# Patient Record
Sex: FEMALE | Race: WHITE | NOT HISPANIC OR LATINO | Employment: OTHER | URBAN - METROPOLITAN AREA
[De-identification: names, ages, dates, MRNs, and addresses within clinical notes are randomized per-mention and may not be internally consistent; named-entity substitution may affect disease eponyms.]

---

## 2018-05-23 ENCOUNTER — HOSPITAL ENCOUNTER (EMERGENCY)
Facility: HOSPITAL | Age: 63
Discharge: HOME | End: 2018-05-24
Attending: EMERGENCY MEDICINE
Payer: COMMERCIAL

## 2018-05-23 DIAGNOSIS — L50.9 HIVES: ICD-10-CM

## 2018-05-23 DIAGNOSIS — T78.40XA ALLERGIC REACTION, INITIAL ENCOUNTER: Primary | ICD-10-CM

## 2018-05-23 PROCEDURE — 96375 TX/PRO/DX INJ NEW DRUG ADDON: CPT

## 2018-05-23 PROCEDURE — 96374 THER/PROPH/DIAG INJ IV PUSH: CPT

## 2018-05-23 PROCEDURE — 96361 HYDRATE IV INFUSION ADD-ON: CPT

## 2018-05-23 PROCEDURE — 96372 THER/PROPH/DIAG INJ SC/IM: CPT

## 2018-05-23 PROCEDURE — 99284 EMERGENCY DEPT VISIT MOD MDM: CPT | Mod: 25

## 2018-05-23 PROCEDURE — 96376 TX/PRO/DX INJ SAME DRUG ADON: CPT

## 2018-05-24 VITALS
SYSTOLIC BLOOD PRESSURE: 127 MMHG | OXYGEN SATURATION: 96 % | RESPIRATION RATE: 18 BRPM | HEIGHT: 66 IN | DIASTOLIC BLOOD PRESSURE: 64 MMHG | WEIGHT: 180.75 LBS | HEART RATE: 73 BPM | TEMPERATURE: 98 F | BODY MASS INDEX: 29.05 KG/M2

## 2018-05-24 VITALS
RESPIRATION RATE: 18 BRPM | HEART RATE: 71 BPM | SYSTOLIC BLOOD PRESSURE: 111 MMHG | DIASTOLIC BLOOD PRESSURE: 69 MMHG | HEIGHT: 66 IN | OXYGEN SATURATION: 95 % | TEMPERATURE: 98 F | WEIGHT: 183.88 LBS | BODY MASS INDEX: 29.55 KG/M2

## 2018-05-24 DIAGNOSIS — L50.9 URTICARIA: Primary | ICD-10-CM

## 2018-05-24 DIAGNOSIS — T78.40XD ALLERGIC REACTION, SUBSEQUENT ENCOUNTER: ICD-10-CM

## 2018-05-24 PROBLEM — T78.40XA ALLERGIC REACTION: Status: ACTIVE | Noted: 2018-05-24

## 2018-05-24 LAB
ALBUMIN SERPL BCP-MCNC: 4.1 G/DL
ALP SERPL-CCNC: 77 U/L
ALT SERPL W/O P-5'-P-CCNC: 16 U/L
ANION GAP SERPL CALC-SCNC: 12 MMOL/L
AST SERPL-CCNC: 16 U/L
BASOPHILS # BLD AUTO: 0.02 K/UL
BASOPHILS NFR BLD: 0.2 %
BILIRUB SERPL-MCNC: 0.4 MG/DL
BUN SERPL-MCNC: 24 MG/DL
CALCIUM SERPL-MCNC: 9.6 MG/DL
CHLORIDE SERPL-SCNC: 101 MMOL/L
CO2 SERPL-SCNC: 27 MMOL/L
CREAT SERPL-MCNC: 1.3 MG/DL
DIFFERENTIAL METHOD: ABNORMAL
EOSINOPHIL # BLD AUTO: 0.2 K/UL
EOSINOPHIL NFR BLD: 1.7 %
ERYTHROCYTE [DISTWIDTH] IN BLOOD BY AUTOMATED COUNT: 13 %
EST. GFR  (AFRICAN AMERICAN): 51 ML/MIN/1.73 M^2
EST. GFR  (NON AFRICAN AMERICAN): 44 ML/MIN/1.73 M^2
GLUCOSE SERPL-MCNC: 149 MG/DL
HCT VFR BLD AUTO: 47.6 %
HGB BLD-MCNC: 16.2 G/DL
LYMPHOCYTES # BLD AUTO: 1.9 K/UL
LYMPHOCYTES NFR BLD: 17.8 %
MCH RBC QN AUTO: 33.1 PG
MCHC RBC AUTO-ENTMCNC: 34 G/DL
MCV RBC AUTO: 97 FL
MONOCYTES # BLD AUTO: 0.3 K/UL
MONOCYTES NFR BLD: 2.9 %
NEUTROPHILS # BLD AUTO: 8.4 K/UL
NEUTROPHILS NFR BLD: 77.4 %
PLATELET # BLD AUTO: 308 K/UL
PMV BLD AUTO: 9.8 FL
POTASSIUM SERPL-SCNC: 3.5 MMOL/L
PROT SERPL-MCNC: 7.7 G/DL
RBC # BLD AUTO: 4.9 M/UL
SODIUM SERPL-SCNC: 140 MMOL/L
WBC # BLD AUTO: 10.85 K/UL

## 2018-05-24 PROCEDURE — 85025 COMPLETE CBC W/AUTO DIFF WBC: CPT

## 2018-05-24 PROCEDURE — 25000242 PHARM REV CODE 250 ALT 637 W/ HCPCS: Performed by: EMERGENCY MEDICINE

## 2018-05-24 PROCEDURE — 63600175 PHARM REV CODE 636 W HCPCS: Performed by: EMERGENCY MEDICINE

## 2018-05-24 PROCEDURE — 99283 EMERGENCY DEPT VISIT LOW MDM: CPT

## 2018-05-24 PROCEDURE — 94640 AIRWAY INHALATION TREATMENT: CPT

## 2018-05-24 PROCEDURE — 25000003 PHARM REV CODE 250: Performed by: EMERGENCY MEDICINE

## 2018-05-24 PROCEDURE — 80053 COMPREHEN METABOLIC PANEL: CPT

## 2018-05-24 PROCEDURE — S0028 INJECTION, FAMOTIDINE, 20 MG: HCPCS | Performed by: EMERGENCY MEDICINE

## 2018-05-24 RX ORDER — DIPHENHYDRAMINE HCL 25 MG
25 CAPSULE ORAL EVERY 6 HOURS PRN
Qty: 20 CAPSULE | Refills: 0 | COMMUNITY
Start: 2018-05-24 | End: 2023-05-08 | Stop reason: SDUPTHER

## 2018-05-24 RX ORDER — CETIRIZINE HYDROCHLORIDE 10 MG/1
10 TABLET ORAL DAILY
COMMUNITY
End: 2023-05-08

## 2018-05-24 RX ORDER — HYDROCHLOROTHIAZIDE 25 MG/1
12.5 TABLET ORAL DAILY
COMMUNITY

## 2018-05-24 RX ORDER — FAMOTIDINE 20 MG/1
20 TABLET, FILM COATED ORAL 2 TIMES DAILY
Qty: 20 TABLET | Refills: 0 | Status: SHIPPED | OUTPATIENT
Start: 2018-05-24 | End: 2023-05-08

## 2018-05-24 RX ORDER — METHYLPREDNISOLONE 4 MG/1
TABLET ORAL
Qty: 1 PACKAGE | Refills: 0 | Status: SHIPPED | OUTPATIENT
Start: 2018-05-24 | End: 2023-05-08

## 2018-05-24 RX ORDER — FAMOTIDINE 10 MG/ML
20 INJECTION INTRAVENOUS
Status: COMPLETED | OUTPATIENT
Start: 2018-05-24 | End: 2018-05-24

## 2018-05-24 RX ORDER — PREDNISONE 20 MG/1
60 TABLET ORAL
Status: COMPLETED | OUTPATIENT
Start: 2018-05-24 | End: 2018-05-24

## 2018-05-24 RX ORDER — ACETAMINOPHEN 500 MG
1000 TABLET ORAL
Status: COMPLETED | OUTPATIENT
Start: 2018-05-24 | End: 2018-05-24

## 2018-05-24 RX ORDER — METHYLPREDNISOLONE SOD SUCC 125 MG
125 VIAL (EA) INJECTION
Status: COMPLETED | OUTPATIENT
Start: 2018-05-24 | End: 2018-05-24

## 2018-05-24 RX ORDER — ZOLPIDEM TARTRATE 5 MG/1
5 TABLET ORAL NIGHTLY PRN
COMMUNITY
End: 2023-05-08

## 2018-05-24 RX ORDER — DIPHENHYDRAMINE HYDROCHLORIDE 50 MG/ML
25 INJECTION INTRAMUSCULAR; INTRAVENOUS
Status: COMPLETED | OUTPATIENT
Start: 2018-05-24 | End: 2018-05-24

## 2018-05-24 RX ORDER — EPINEPHRINE 0.3 MG/.3ML
1 INJECTION SUBCUTANEOUS
Qty: 1 DEVICE | Refills: 1 | Status: SHIPPED | OUTPATIENT
Start: 2018-05-24 | End: 2019-05-24

## 2018-05-24 RX ORDER — PANTOPRAZOLE SODIUM 40 MG/1
40 TABLET, DELAYED RELEASE ORAL 2 TIMES DAILY
COMMUNITY
End: 2023-11-27 | Stop reason: SDUPTHER

## 2018-05-24 RX ORDER — DESVENLAFAXINE SUCCINATE 50 MG/1
100 TABLET, EXTENDED RELEASE ORAL DAILY
COMMUNITY
End: 2023-05-08

## 2018-05-24 RX ORDER — EPINEPHRINE 1 MG/ML
0.3 INJECTION, SOLUTION INTRACARDIAC; INTRAMUSCULAR; INTRAVENOUS; SUBCUTANEOUS
Status: COMPLETED | OUTPATIENT
Start: 2018-05-24 | End: 2018-05-24

## 2018-05-24 RX ORDER — GABAPENTIN 300 MG/1
300 CAPSULE ORAL 3 TIMES DAILY
COMMUNITY
End: 2018-05-24

## 2018-05-24 RX ORDER — CHOLECALCIFEROL (VITAMIN D3) 25 MCG
2000 TABLET ORAL DAILY
COMMUNITY
End: 2023-05-08

## 2018-05-24 RX ORDER — IPRATROPIUM BROMIDE AND ALBUTEROL SULFATE 2.5; .5 MG/3ML; MG/3ML
3 SOLUTION RESPIRATORY (INHALATION)
Status: COMPLETED | OUTPATIENT
Start: 2018-05-24 | End: 2018-05-24

## 2018-05-24 RX ADMIN — METHYLPREDNISOLONE SODIUM SUCCINATE 125 MG: 125 INJECTION, POWDER, FOR SOLUTION INTRAMUSCULAR; INTRAVENOUS at 12:05

## 2018-05-24 RX ADMIN — SODIUM CHLORIDE 1000 ML: 0.9 INJECTION, SOLUTION INTRAVENOUS at 01:05

## 2018-05-24 RX ADMIN — ACETAMINOPHEN 1000 MG: 500 TABLET, FILM COATED ORAL at 01:05

## 2018-05-24 RX ADMIN — FAMOTIDINE 20 MG: 10 INJECTION INTRAVENOUS at 12:05

## 2018-05-24 RX ADMIN — IPRATROPIUM BROMIDE AND ALBUTEROL SULFATE 3 ML: .5; 3 SOLUTION RESPIRATORY (INHALATION) at 01:05

## 2018-05-24 RX ADMIN — DIPHENHYDRAMINE HYDROCHLORIDE 25 MG: 50 INJECTION, SOLUTION INTRAMUSCULAR; INTRAVENOUS at 01:05

## 2018-05-24 RX ADMIN — PREDNISONE 60 MG: 20 TABLET ORAL at 07:05

## 2018-05-24 RX ADMIN — DIPHENHYDRAMINE HYDROCHLORIDE 25 MG: 50 INJECTION, SOLUTION INTRAMUSCULAR; INTRAVENOUS at 12:05

## 2018-05-24 RX ADMIN — EPINEPHRINE 0.3 MG: 1 INJECTION INTRAMUSCULAR; INTRAVENOUS; SUBCUTANEOUS at 01:05

## 2018-05-24 RX ADMIN — SODIUM CHLORIDE 1000 ML: 0.9 INJECTION, SOLUTION INTRAVENOUS at 12:05

## 2018-05-24 NOTE — ED NOTES
Patient requesting more benadryl to help with itching at this time. Patient remains on cardiac monitor. Visible from nurses station, family at bedside.

## 2018-05-24 NOTE — DISCHARGE INSTRUCTIONS
Patient is safe for discharge. There is no suggestion of airway or ENT emergency. Patient is hemodynamically stable and there is no suggestion of active anaphylaxis or progressive worsening of current symptoms.    Hives are generally idiopathic; possibly related to foods, sun, heat, cold or viruses. Use OTC benadryl, call if symptoms persist or worsen. Can try other antihistamines or possibly H2 blockers later prn.

## 2018-05-24 NOTE — ED NOTES
Patient rechecked at this time. Patient resting in bed asleep. Family at bedside. Patient remains on cardiac monitor, visible from nurses station.

## 2018-05-24 NOTE — ED PROVIDER NOTES
SCRIBE #1 NOTE: I, Corinne Mack, am scribing for, and in the presence of, Gary Boyer MD. I have scribed the entire note.      History      Chief Complaint   Patient presents with    Urticaria     pt seen here last night for hives, states she was told to come back if it got worse; pt states she is having an allergic reaction to gabapentin        Review of patient's allergies indicates:   Allergen Reactions    Depakote [divalproex] Other (See Comments)     States caused her to black out    Iodine and iodide containing products Rash    Morpholine analogues Hives    Gabapentin Hives        HPI   HPI    5/24/2018, 7:05 AM   History obtained from the patient      History of Present Illness: Nicollette Mourot is a 63 y.o. female patient with PMHx of anxiety, GERD, and HTN who presents to the Emergency Department for generalized, pruritic rash which onset gradually PTA. Pt was seen at this facility earlier today for an allergic reaction to gabapentin and was d/c at 5:00 AM with an Epipen, Pepcid, a medrol dose pack, and benadryl. Pt reports that she broke out in hives again when she returned home and was unable to get her prescriptions filled, because of the time. Symptoms are constanty and mild in severity. No mitigating or exacerbating factors reported. No associated sxs reported. Patient denies any drooling, tongue swelling, facial swelling, voice change, trouble swallowing, SOB, wheezing, stridor, HA, dizziness, and all other sxs at this time. Prior Tx includes benadryl, prednisone, and epinephrine with temporary relief. No further complaints or concerns at this time.         Arrival mode: Personal vehicle    PCP: Rosemarie Quezada MD       Past Medical History:  Past Medical History:   Diagnosis Date    Anxiety     Depression     GERD (gastroesophageal reflux disease)     Hypertension        Past Surgical History:  No past surgical history on file.      Family History:  No family history on  file.    Social History:  Social History     Social History Main Topics    Smoking status: Never Smoker    Smokeless tobacco: Never Used    Alcohol use No    Drug use: Unknown    Sexual activity: Not on file       ROS   Review of Systems   Constitutional: Negative for chills and fever.   HENT: Negative for drooling, facial swelling, trouble swallowing and voice change.         (-) tongue swelling   Respiratory: Negative for cough, shortness of breath, wheezing and stridor.    Cardiovascular: Negative for chest pain and leg swelling.   Gastrointestinal: Negative for abdominal pain, diarrhea, nausea and vomiting.   Musculoskeletal: Negative for back pain, neck pain and neck stiffness.   Skin: Positive for rash (generalized; itching). Negative for wound.   Neurological: Negative for dizziness, light-headedness, numbness and headaches.   All other systems reviewed and are negative.    Physical Exam      Initial Vitals [05/24/18 0658]   BP Pulse Resp Temp SpO2   111/69 71 18 97.6 °F (36.4 °C) 95 %      MAP       83          Physical Exam  Nursing Notes and Vital Signs Reviewed.  Constitutional: Patient is in no apparent distress. Well-developed and well-nourished.  Head: Atraumatic. Normocephalic.  Eyes: PERRL. EOM intact. Conjunctivae are not pale. No scleral icterus.  ENT: Mucous membranes are moist. Oropharynx is clear and symmetric.  Airway is patent.  Neck: Supple. Full ROM. No lymphadenopathy.  Cardiovascular: Regular rate. Regular rhythm. No murmurs, rubs, or gallops. Distal pulses are 2+ and symmetric.  Pulmonary/Chest: No respiratory distress. Clear to auscultation bilaterally. No wheezing or rales.  Abdominal: Soft and non-distended.  There is no tenderness.  No rebound, guarding, or rigidity.   Musculoskeletal: Moves all extremities. No obvious deformities. No edema.   Skin: Warm, dry, and intact. Flat, non-vesicular, erythematic rash to the BUE, BLE, trunk, and face.  Neurological:  Alert, awake, and  "appropriate.  Normal speech.  No acute focal neurological deficits are appreciated. Grossly neurologically intact.  Psychiatric: Normal affect. Good eye contact. Appropriate in content.    ED Course    Procedures  ED Vital Signs:  Vitals:    05/24/18 0658   BP: 111/69   Pulse: 71   Resp: 18   Temp: 97.6 °F (36.4 °C)   TempSrc: Oral   SpO2: 95%   Weight: 83.4 kg (183 lb 13.8 oz)   Height: 5' 6" (1.676 m)       Abnormal Lab Results:  Labs Reviewed - No data to display     All Lab Results:  None    Imaging Results:  Imaging Results    None                 The Emergency Provider reviewed the vital signs and test results, which are outlined above.    ED Discussion     7:23 AM: Pt is awake, alert, and in no distress. Discussed pt plan of tx. Pt has no wheezing or severe hypotension. Instructed pt to discontinue her gabapentin. Pt is only on 300 mg daily, therefore there is no need to taper the pt off of it. If the pt were on a higher dose a taper would be required because of the possible risks of seizures and withdrawal. Pt advised to fill her prescriptions given to her this morning. Gave pt all f/u and return to the ED instructions. Pt should return to the ED for any new or worsening sxs, such as SOB or worsening urticaria. All questions and concerns were addressed at this time. Pt expresses understanding of information and instructions, and is comfortable with plan to discharge. Pt is stable for discharge.    I discussed with patient and/or family/caretaker that evaluation in the ED does not suggest any emergent or life threatening medical conditions requiring immediate intervention beyond what was provided in the ED, and I believe patient is safe for discharge.  Regardless, an unremarkable evaluation in the ED does not preclude the development or presence of a serious of life threatening condition. As such, patient was instructed to return immediately for any worsening or change in current symptoms.    Patient is safe " for discharge. There is no suggestion of airway or ENT emergency. Patient is hemodynamically stable and there is no suggestion of active anaphylaxis or progressive worsening of current symptoms.      ED Medication(s):  Medications   predniSONE tablet 60 mg (60 mg Oral Given 5/24/18 0721)       New Prescriptions    No medications on file       Follow-up Information     Rosemarie Quezada MD In 1 day.    Specialty:  Family Medicine  Why:  Patient should return to the ED for any concerns or worsening of condition.  Contact information:  [address] [id number] [phone]                     Medical Decision Making              Scribe Attestation:   Scribe #1: I performed the above scribed service and the documentation accurately describes the services I performed. I attest to the accuracy of the note.    Attending:   Physician Attestation Statement for Scribe #1: I, Gary Boyer MD, personally performed the services described in this documentation, as scribed by Corinne Mack, in my presence, and it is both accurate and complete.          Clinical Impression       ICD-10-CM ICD-9-CM   1. Urticaria L50.9 708.9   2. Allergic reaction, subsequent encounter T78.40XD V58.89     995.3       Disposition:   Disposition: Discharged  Condition: Stable           Gary Boyer MD  05/25/18 0655

## 2018-05-24 NOTE — ED PROVIDER NOTES
SCRIBE #1 NOTE: I, Mary Mccoy, am scribing for, and in the presence of, Kobe Perera Jr., MD. I have scribed the entire note.      History      Chief Complaint   Patient presents with    Allergic Reaction     states feels like having an allergic rx to gabapentin. States took benadrl but cont with rash and hives.        Review of patient's allergies indicates:   Allergen Reactions    Depakote [divalproex] Other (See Comments)     States caused her to black out    Iodine and iodide containing products Rash    Morpholine analogues Hives        HPI   HPI    5/24/2018, 12:18 AM   History obtained from the patient      History of Present Illness: Nicollette Mourot is a 63 y.o. female patient who presents to the Emergency Department for allergic reaction which onset gradually earlier tonight. Symptoms are constant and moderate in severity. Pt reports starting gabapentin about 1 week ago and states sxs started around the same time but worsened tonight. Pt reports taking prednisone (for myelogram being performed tomorrow morning) around 7pm and benadryl around 8pm with no relief of sxs. No mitigating or exacerbating factors reported. Associated sxs include generalized body rash with itching. Patient denies any CP, SOB, difficulty swallowing, speech changes, n/v/d, abd pain, and all other sxs at this time. No further complaints or concerns at this time.         Arrival mode: Personal vehicle     PCP: Rosemarie Quezada MD       Past Medical History:  Past Medical History:   Diagnosis Date    Anxiety     Depression     GERD (gastroesophageal reflux disease)     Hypertension        Past Surgical History:  History reviewed. No pertinent surgical history.      Family History:  History reviewed. No pertinent family history.    Social History:  Social History     Social History Main Topics    Smoking status: Never Smoker    Smokeless tobacco: Never Used    Alcohol use No    Drug use: Unknown    Sexual activity: Not  given       ROS   Review of Systems   Constitutional: Negative for appetite change and fever.   HENT: Negative for sore throat, trouble swallowing and voice change.    Respiratory: Negative for shortness of breath.    Cardiovascular: Negative for chest pain.   Gastrointestinal: Negative for abdominal pain, diarrhea, nausea and vomiting.   Genitourinary: Negative for dysuria.   Musculoskeletal: Negative for back pain.   Skin: Positive for rash (Generalized body rash).   Neurological: Negative for weakness.   Hematological: Does not bruise/bleed easily.   All other systems reviewed and are negative.      Physical Exam      Initial Vitals [05/23/18 2352]   BP Pulse Resp Temp SpO2   102/75 93 18 97.9 °F (36.6 °C) 96 %      MAP       84          Physical Exam  Nursing Notes and Vital Signs Reviewed.  Constitutional: Patient is in no acute distress. Awake and alert. Appropriate for age.   Head: Atraumatic. No facial instability or step-offs.   Eyes: PERRL. EOM normal. Conjunctivae normal.   HENT: Moist mucous membranes. No epistaxis. Patent airway.   Neck: No midline bony tenderness, deformities, or step-offs   Cardiovascular: Regular rate and rhythm. Heart sounds are normal. Intact distal pulses   Pulmonary/Chest: No respiratory distress. Breath sounds are normal. No decreased breath sounds. Chest wall is stable.   Abdominal: Soft and non-distended. Non-tender.   Back: No abrasions or ecchymosis. No midline bony tenderness to the T-spine or L-spine. No deformities or step-offs.   Musculoskeletal: Full range of motion in bilateral extremities. No obvious deformities.   Skin: Normal color. No cyanosis. No lacerations. No abrasions. Generalized urticarial rash consistent with hives.   Neurological: Awake and alert. Appropriate for age. GCS 15. Normal speech. Motor strength is normal at 5/5 bilaterally. Non-focal neurological examination.      ED Course    Procedures  ED Vital Signs:  Vitals:    05/23/18 2352 05/24/18 0114  "05/24/18 0116 05/24/18 0201   BP: 102/75  (!) 111/59 131/62   Pulse: 93 74 75 72   Resp: 18 14 15 19   Temp: 97.9 °F (36.6 °C)   98.1 °F (36.7 °C)   TempSrc: Oral   Axillary   SpO2: 96% 98% 98% 100%   Weight: 82 kg (180 lb 12.4 oz)      Height: 5' 6" (1.676 m)       05/24/18 0301 05/24/18 0331 05/24/18 0516 05/24/18 0520   BP: 123/66 (!) 122/55 127/64    Pulse: 76 75 73    Resp: 17 18 18    Temp:    98.3 °F (36.8 °C)   TempSrc:       SpO2: 98% 96% 96%    Weight:       Height:           Abnormal Lab Results:  Labs Reviewed   CBC W/ AUTO DIFFERENTIAL - Abnormal; Notable for the following:        Result Value    Hemoglobin 16.2 (*)     MCH 33.1 (*)     Gran # (ANC) 8.4 (*)     Gran% 77.4 (*)     Lymph% 17.8 (*)     Mono% 2.9 (*)     All other components within normal limits   COMPREHENSIVE METABOLIC PANEL - Abnormal; Notable for the following:     Glucose 149 (*)     BUN, Bld 24 (*)     eGFR if  51 (*)     eGFR if non  44 (*)     All other components within normal limits        All Lab Results:  Results for orders placed or performed during the hospital encounter of 05/23/18   CBC auto differential   Result Value Ref Range    WBC 10.85 3.90 - 12.70 K/uL    RBC 4.90 4.00 - 5.40 M/uL    Hemoglobin 16.2 (H) 12.0 - 16.0 g/dL    Hematocrit 47.6 37.0 - 48.5 %    MCV 97 82 - 98 fL    MCH 33.1 (H) 27.0 - 31.0 pg    MCHC 34.0 32.0 - 36.0 g/dL    RDW 13.0 11.5 - 14.5 %    Platelets 308 150 - 350 K/uL    MPV 9.8 9.2 - 12.9 fL    Gran # (ANC) 8.4 (H) 1.8 - 7.7 K/uL    Lymph # 1.9 1.0 - 4.8 K/uL    Mono # 0.3 0.3 - 1.0 K/uL    Eos # 0.2 0.0 - 0.5 K/uL    Baso # 0.02 0.00 - 0.20 K/uL    Gran% 77.4 (H) 38.0 - 73.0 %    Lymph% 17.8 (L) 18.0 - 48.0 %    Mono% 2.9 (L) 4.0 - 15.0 %    Eosinophil% 1.7 0.0 - 8.0 %    Basophil% 0.2 0.0 - 1.9 %    Differential Method Automated    Comprehensive metabolic panel   Result Value Ref Range    Sodium 140 136 - 145 mmol/L    Potassium 3.5 3.5 - 5.1 mmol/L    Chloride " 101 95 - 110 mmol/L    CO2 27 23 - 29 mmol/L    Glucose 149 (H) 70 - 110 mg/dL    BUN, Bld 24 (H) 8 - 23 mg/dL    Creatinine 1.3 0.5 - 1.4 mg/dL    Calcium 9.6 8.7 - 10.5 mg/dL    Total Protein 7.7 6.0 - 8.4 g/dL    Albumin 4.1 3.5 - 5.2 g/dL    Total Bilirubin 0.4 0.1 - 1.0 mg/dL    Alkaline Phosphatase 77 55 - 135 U/L    AST 16 10 - 40 U/L    ALT 16 10 - 44 U/L    Anion Gap 12 8 - 16 mmol/L    eGFR if African American 51 (A) >60 mL/min/1.73 m^2    eGFR if non African American 44 (A) >60 mL/min/1.73 m^2                  The Emergency Provider reviewed the vital signs and test results, which are outlined above.    ED Discussion       1:00 AM: Pt reports sxs are worsening.    1:05 AM: .3mg of epinephrine given IM/SC.    1:34 AM: Pt reports she is still itching at this time.    5:00 AM: Reassessed pt at this time.  Pt states her condition has improved at this time.  Rash has improved, pt airway intact, able to manage secretions, no change in voice, and able to ambulate without assistance. Discussed with pt all pertinent ED information and results. Discussed pt dx and plan of tx. Gave pt all f/u and return to the ED instructions. All questions and concerns were addressed at this time. Pt expresses understanding of information and instructions, and is comfortable with plan to discharge. Pt is stable for discharge.    Patient is safe for discharge. There is no suggestion of airway or ENT emergency. Patient is hemodynamically stable and there is no suggestion of active anaphylaxis or progressive worsening of current symptoms.    Hives are generally idiopathic; possibly related to foods, sun, heat, cold or viruses. Use OTC benadryl, call if symptoms persist or worsen. Can try other antihistamines or possibly H2 blockers later prn.      ED Medication(s):  Medications   sodium chloride 0.9% bolus 1,000 mL (0 mLs Intravenous Stopped 5/24/18 0144)   methylPREDNISolone sodium succinate injection 125 mg (125 mg Intravenous Given  5/24/18 0033)   famotidine (PF) injection 20 mg (20 mg Intravenous Given 5/24/18 0030)   diphenhydrAMINE injection 25 mg (25 mg Intravenous Given 5/24/18 0032)   EPINEPHrine injection 0.3 mg (0.3 mg Intramuscular Given 5/24/18 0111)   albuterol-ipratropium 2.5 mg-0.5 mg/3 mL nebulizer solution 3 mL (3 mLs Nebulization Given 5/24/18 0114)   acetaminophen tablet 1,000 mg (1,000 mg Oral Given 5/24/18 0145)   diphenhydrAMINE injection 25 mg (25 mg Intravenous Given 5/24/18 0144)   sodium chloride 0.9% bolus 1,000 mL (0 mLs Intravenous Stopped 5/24/18 0220)       Discharge Medication List as of 5/24/2018  4:59 AM      START taking these medications    Details   diphenhydrAMINE (BENADRYL) 25 mg capsule Take 1 each (25 mg total) by mouth every 6 (six) hours as needed for Itching or Allergies., Starting Thu 5/24/2018, OTC      EPINEPHrine (EPIPEN) 0.3 mg/0.3 mL AtIn Inject 0.3 mLs (0.3 mg total) into the muscle as needed., Starting Thu 5/24/2018, Until Fri 5/24/2019, Print      famotidine (PEPCID) 20 MG tablet Take 1 tablet (20 mg total) by mouth 2 (two) times daily., Starting Thu 5/24/2018, Until Fri 5/24/2019, Print      methylPREDNISolone (MEDROL DOSEPACK) 4 mg tablet Take medrol dose pack as directed, Print             Follow-up Information     Rosemarie Quezada MD. Schedule an appointment as soon as possible for a visit in 1 week.    Specialty:  Family Medicine  Contact information:  [address] [address] [phone]             [facility]    Specialty:  Emergency Medicine  Why:  As needed, If symptoms worsen  Contact information:  [address]  [facility] [address] [phone]                   Medical Decision Making    Medical Decision Making:   Clinical Tests:   Lab Tests: Ordered and Reviewed           Scribe Attestation:   Scribe #1: I performed the above scribed service and the documentation accurately describes the services I performed. I  attest to the accuracy of the note.    Attending:   Physician Attestation Statement for Scribe #1: I, Kobe Perera Jr., MD, personally performed the services described in this documentation, as scribed by Mary Mccoy, in my presence, and it is both accurate and complete.          Clinical Impression       ICD-10-CM ICD-9-CM   1. Allergic reaction, initial encounter T78.40XA 995.3   2. Hives L50.9 708.9       Disposition:   Disposition: Discharged  Condition: Stable         Kobe Perera Jr., MD  05/24/18 2025

## 2021-08-16 ENCOUNTER — LAB VISIT (OUTPATIENT)
Dept: PRIMARY CARE CLINIC | Facility: OTHER | Age: 66
End: 2021-08-16
Payer: OTHER GOVERNMENT

## 2021-08-16 DIAGNOSIS — R06.02 SHORTNESS OF BREATH: ICD-10-CM

## 2021-08-16 DIAGNOSIS — R05.9 COUGH: ICD-10-CM

## 2021-08-16 PROCEDURE — U0003 INFECTIOUS AGENT DETECTION BY NUCLEIC ACID (DNA OR RNA); SEVERE ACUTE RESPIRATORY SYNDROME CORONAVIRUS 2 (SARS-COV-2) (CORONAVIRUS DISEASE [COVID-19]), AMPLIFIED PROBE TECHNIQUE, MAKING USE OF HIGH THROUGHPUT TECHNOLOGIES AS DESCRIBED BY CMS-2020-01-R: HCPCS

## 2021-08-18 LAB
SARS-COV-2 RNA RESP QL NAA+PROBE: NOT DETECTED
SARS-COV-2- CYCLE NUMBER: -1

## 2023-05-08 ENCOUNTER — OFFICE VISIT (OUTPATIENT)
Dept: PRIMARY CARE CLINIC | Facility: CLINIC | Age: 68
End: 2023-05-08
Payer: MEDICARE

## 2023-05-08 VITALS
DIASTOLIC BLOOD PRESSURE: 75 MMHG | OXYGEN SATURATION: 98 % | SYSTOLIC BLOOD PRESSURE: 114 MMHG | HEIGHT: 66 IN | HEART RATE: 73 BPM | RESPIRATION RATE: 18 BRPM | WEIGHT: 193.13 LBS | BODY MASS INDEX: 31.04 KG/M2 | TEMPERATURE: 98 F

## 2023-05-08 DIAGNOSIS — F32.A ANXIETY AND DEPRESSION: ICD-10-CM

## 2023-05-08 DIAGNOSIS — J30.2 SEASONAL ALLERGIES: ICD-10-CM

## 2023-05-08 DIAGNOSIS — E03.9 ACQUIRED HYPOTHYROIDISM: Primary | ICD-10-CM

## 2023-05-08 DIAGNOSIS — R73.03 PREDIABETES: ICD-10-CM

## 2023-05-08 DIAGNOSIS — E55.9 VITAMIN D DEFICIENCY: ICD-10-CM

## 2023-05-08 DIAGNOSIS — F41.9 ANXIETY AND DEPRESSION: ICD-10-CM

## 2023-05-08 DIAGNOSIS — Z86.73 HISTORY OF STROKE: ICD-10-CM

## 2023-05-08 DIAGNOSIS — E78.5 HYPERLIPIDEMIA, UNSPECIFIED HYPERLIPIDEMIA TYPE: ICD-10-CM

## 2023-05-08 PROBLEM — I69.359 HISTORY OF HEMORRHAGIC STROKE WITH RESIDUAL HEMIPARESIS: Status: ACTIVE | Noted: 2022-04-20

## 2023-05-08 PROBLEM — M50.30 DDD (DEGENERATIVE DISC DISEASE), CERVICAL: Status: ACTIVE | Noted: 2022-04-20

## 2023-05-08 PROBLEM — E66.9 OBESITY: Status: ACTIVE | Noted: 2018-10-02

## 2023-05-08 PROBLEM — I44.0 FIRST DEGREE HEART BLOCK: Status: ACTIVE | Noted: 2020-01-15

## 2023-05-08 PROCEDURE — 99215 PR OFFICE/OUTPT VISIT, EST, LEVL V, 40-54 MIN: ICD-10-PCS | Mod: S$GLB,,, | Performed by: FAMILY MEDICINE

## 2023-05-08 PROCEDURE — 3074F SYST BP LT 130 MM HG: CPT | Mod: CPTII,S$GLB,, | Performed by: FAMILY MEDICINE

## 2023-05-08 PROCEDURE — 1126F AMNT PAIN NOTED NONE PRSNT: CPT | Mod: CPTII,S$GLB,, | Performed by: FAMILY MEDICINE

## 2023-05-08 PROCEDURE — 99999 PR PBB SHADOW E&M-EST. PATIENT-LVL III: CPT | Mod: PBBFAC,,, | Performed by: FAMILY MEDICINE

## 2023-05-08 PROCEDURE — 99215 OFFICE O/P EST HI 40 MIN: CPT | Mod: S$GLB,,, | Performed by: FAMILY MEDICINE

## 2023-05-08 PROCEDURE — 3008F BODY MASS INDEX DOCD: CPT | Mod: CPTII,S$GLB,, | Performed by: FAMILY MEDICINE

## 2023-05-08 PROCEDURE — 99999 PR PBB SHADOW E&M-EST. PATIENT-LVL III: ICD-10-PCS | Mod: PBBFAC,,, | Performed by: FAMILY MEDICINE

## 2023-05-08 PROCEDURE — 3074F PR MOST RECENT SYSTOLIC BLOOD PRESSURE < 130 MM HG: ICD-10-PCS | Mod: CPTII,S$GLB,, | Performed by: FAMILY MEDICINE

## 2023-05-08 PROCEDURE — 3008F PR BODY MASS INDEX (BMI) DOCUMENTED: ICD-10-PCS | Mod: CPTII,S$GLB,, | Performed by: FAMILY MEDICINE

## 2023-05-08 PROCEDURE — 3078F DIAST BP <80 MM HG: CPT | Mod: CPTII,S$GLB,, | Performed by: FAMILY MEDICINE

## 2023-05-08 PROCEDURE — 3078F PR MOST RECENT DIASTOLIC BLOOD PRESSURE < 80 MM HG: ICD-10-PCS | Mod: CPTII,S$GLB,, | Performed by: FAMILY MEDICINE

## 2023-05-08 PROCEDURE — 1126F PR PAIN SEVERITY QUANTIFIED, NO PAIN PRESENT: ICD-10-PCS | Mod: CPTII,S$GLB,, | Performed by: FAMILY MEDICINE

## 2023-05-08 RX ORDER — LEVOTHYROXINE SODIUM 100 UG/1
TABLET ORAL
COMMUNITY
End: 2023-11-27

## 2023-05-08 RX ORDER — DULOXETIN HYDROCHLORIDE 30 MG/1
CAPSULE, DELAYED RELEASE ORAL
COMMUNITY
End: 2024-03-18 | Stop reason: SDUPTHER

## 2023-05-08 RX ORDER — MONTELUKAST SODIUM 10 MG/1
10 TABLET ORAL NIGHTLY
Qty: 90 TABLET | Refills: 2 | Status: SHIPPED | OUTPATIENT
Start: 2023-05-08

## 2023-05-08 RX ORDER — ALBUTEROL SULFATE 0.63 MG/3ML
SOLUTION RESPIRATORY (INHALATION)
COMMUNITY

## 2023-05-08 RX ORDER — FLUTICASONE FUROATE AND VILANTEROL TRIFENATATE 200; 25 UG/1; UG/1
POWDER RESPIRATORY (INHALATION)
COMMUNITY
Start: 2023-03-14

## 2023-05-08 RX ORDER — DUPILUMAB 200 MG/1.14ML
INJECTION, SOLUTION SUBCUTANEOUS
COMMUNITY
Start: 2023-03-01

## 2023-05-08 RX ORDER — SIMVASTATIN 10 MG/1
10 TABLET, FILM COATED ORAL NIGHTLY
COMMUNITY
End: 2023-05-08

## 2023-05-08 RX ORDER — SEMAGLUTIDE 2.68 MG/ML
2 INJECTION, SOLUTION SUBCUTANEOUS
Qty: 3 ML | Refills: 5 | Status: SHIPPED | OUTPATIENT
Start: 2023-05-08 | End: 2023-07-31 | Stop reason: SDUPTHER

## 2023-05-08 RX ORDER — PROMETHAZINE HYDROCHLORIDE 25 MG/1
TABLET ORAL
COMMUNITY
End: 2023-05-08

## 2023-05-08 RX ORDER — ROSUVASTATIN CALCIUM 20 MG/1
20 TABLET, COATED ORAL DAILY
Qty: 90 TABLET | Refills: 3 | Status: SHIPPED | OUTPATIENT
Start: 2023-05-08 | End: 2024-03-14 | Stop reason: SDUPTHER

## 2023-05-08 RX ORDER — ZOLPIDEM TARTRATE 10 MG/1
1 TABLET ORAL NIGHTLY PRN
COMMUNITY
Start: 2023-04-06 | End: 2023-05-08 | Stop reason: SDUPTHER

## 2023-05-08 NOTE — PROGRESS NOTES
"    [facility]       [address] [place] [address]      [phone]     Rosemarie Quezada MD         .      Office Visit  05/08/2023  [id number]      SUBJECTIVE     HPI:  Nicollette Mourot is a 68 y.o. female presents today in clinic for "Follow-up  ."   [place] [facility] [place] has hx of hemorrhagic CVA in 2018- patient has struggled for long while with weigth- has done previously well on qsymia- patient is also on dupixent for HOEE- more improved and also still takes singulair.  Her asthma has also been well controlled.  She also has neurostimulator for chronic pain related to radiculopathy. She rarely takes pain meds as her pain has been improved.    Patient is still taking thyroid medications, but does think that perhaps Armor was more effective for her.  She did have labs in January, but no change was made to her regimen.  She used to take Ambien, for sleep, but stopped this as she felt like it was making her fatigued.  She used to take Pristiq for chronic anxiety and depression, but stopped this as well as now taking a lower dose of Cymbalta.  She feels like the Cymbalta has helped greatly with her neck pain and tension.  Question weight gain effect with this medication.  Patient is here to discuss alternatives to help with her weight gain, and to repeat labs and see if more adjustments need to be made.  At her last lab, her cholesterol was shown to be elevated, as she was started on simvastatin.  Since starting, she questions that she is been having more nighttime awakenings and wendy horses.  Has not had labs done to repeat lipids after starting medication    Follow-up      ROS   Review of symptoms are negative except as noted.     PAST MEDICAL HISTORY     Past Medical History:   Diagnosis Date    Anxiety     Depression     GERD (gastroesophageal reflux disease)     Hypertension        Past Surgical History:   Procedure Laterality Date    " BACK SURGERY      NECK SURGERY         Social History     Socioeconomic History    Marital status:    Tobacco Use    Smoking status: Never     Passive exposure: Never    Smokeless tobacco: Never   Substance and Sexual Activity    Alcohol use: No       Allergies as of 05/08/2023 - Reviewed 05/08/2023   Allergen Reaction Noted    Divalproex Other (See Comments) 05/25/2015    Iodine and iodide containing products Rash 05/23/2018    Morphine Anaphylaxis 05/25/2012    Morpholine analogues Hives 05/23/2018    Prednisone Hives and Anaphylaxis 12/02/2019    Gabapentin Hives 05/24/2018    Iodine Other (See Comments) 05/25/2012    Adhesive Rash 08/08/2018       HOME MEDS     Current Outpatient Medications   Medication Instructions    albuterol (ACCUNEB) 0.63 mg/3 mL Nebu albuterol sulfate Take No date recorded No form recorded No frequency recorded No route recorded No set duration recorded No set duration amount recorded suspended No dosage strength recorded No dosage strength units of measure recorded    BREO ELLIPTA 200-25 mcg/dose DsDv diskus inhaler Inhalation    DULoxetine (CYMBALTA) 30 MG capsule Oral    dupilumab (DUPIXENT SYRINGE) 200 mg/1.14 mL Syrg INJECT 200MG (1 INJECTION) SUBCUTANEOUSLY EVERY OTHER WEEK.    EPINEPHrine (EPIPEN) 0.3 mg, Intramuscular, As needed (PRN)    hydroCHLOROthiazide (HYDRODIURIL) 12.5 mg, Oral, Daily, Every other day    HYDROCODONE-ACETAMINOPHEN ORAL Daily PRN    levothyroxine (SYNTHROID) 100 MCG tablet levothyroxine Take 1 (oral) 1 time per day No date recorded tablet 1 time per day oral No set duration recorded No set duration amount recorded active 100 mcg    montelukast (SINGULAIR) 10 mg, Oral, Nightly    NABUMETONE ORAL Oral, 2 times daily PRN    OZEMPIC 2 mg, Subcutaneous, Every 7 days    pantoprazole (PROTONIX) 40 mg, Oral, 2 times daily    rosuvastatin (CRESTOR) 20 mg, Oral, Daily       The following were updated and reviewed by myself in the chart: medications, past medical  "history, past surgical history, family history, social history, and allergies.        Objective    OBJECTIVE   /75   Pulse 73   Temp 97.6 °F (36.4 °C)   Resp 18   Ht 5' 6" (1.676 m)   Wt 87.6 kg (193 lb 2 oz)   SpO2 98%   BMI 31.17 kg/m²     Wt Readings from Last 2 Encounters:   05/08/23 87.6 kg (193 lb 2 oz)   05/24/18 83.4 kg (183 lb 13.8 oz)       BP Readings from Last 2 Encounters:   05/08/23 114/75   05/24/18 111/69          Physical Exam  Vitals and nursing note reviewed.   Constitutional:       Appearance: Normal appearance. She is obese.   HENT:      Head: Normocephalic and atraumatic.      Nose: Nose normal.   Eyes:      General: Allergic shiner present.      Extraocular Movements: Extraocular movements intact.      Conjunctiva/sclera: Conjunctivae normal.      Pupils: Pupils are equal, round, and reactive to light.   Neck:      Comments: Increased neck circumference  Cardiovascular:      Rate and Rhythm: Normal rate and regular rhythm.   Pulmonary:      Effort: Pulmonary effort is normal.      Breath sounds: Normal breath sounds.   Abdominal:      General: Abdomen is flat.      Palpations: Abdomen is soft.      Tenderness: There is no abdominal tenderness.      Comments: Increased abdominal girth   Musculoskeletal:         General: No swelling or tenderness. Normal range of motion.      Cervical back: Normal range of motion.      Comments: TMJ tenderness   Lymphadenopathy:      Cervical: No cervical adenopathy.   Skin:     General: Skin is warm.      Findings: No lesion or rash.   Neurological:      General: No focal deficit present.      Mental Status: She is alert. Mental status is at baseline.      Cranial Nerves: Cranial nerves 2-12 are intact.      Sensory: Sensation is intact.      Motor: Motor function is intact.   Psychiatric:         Mood and Affect: Mood is anxious.         Behavior: Behavior normal.         Cognition and Memory: Cognition and memory normal.           LABS      LAB " RESULTS, IF AVAILABLE, ARE DISCUSSED WITH PATIENT AND POSTED TO PATIENT PORTAL     ASSESSMENT & PLAN     1. Acquired hypothyroidism  Patient advised to comply with thyroid medications as directed. Patient should take thyroid meds on empty stomach and avoid taking with iron, calcium, zinc and fiber.  Potential risks associated with suppressing TSH (increased arrhythmia and bone loss) can occur as a result of thyroid replacement therapy. Emphasis is  on improving patient symptoms. Patient should notify us if any symptoms occur suggestive of overactive thyroid (fast heart rate, anxiety, sleeplessness, and tremors). Routine follow up recommended we will repeat labs, T4 was slightly elevated last time but no changes were made.  -     TSH; Future; Expected date: 05/08/2023  -     T4, Free; Future; Expected date: 05/08/2023  -     Misc Sendout Test, Blood Free T3- [facility] 21081; Future; Expected date: 05/08/2023  -     Misc Sendout Test, Blood Reverse T3 80807; Future; Expected date: 05/08/2023    2. Prediabetes  Pathophysiology of insulin resistance discussed with patient and therapeutic options were explained.  Dietary and lifestyle changes are recommended for the treatment of insulin resistance.   Low carbohydrate diet and/or possible intermittent fasting is recommended for insulin resistance and/or prediabetes.  Non FDA approved treatment options include but is not limited to GLP's, SGLT-2's, biguanides, and other glucose support supplements. Patient will notify us with any concerns or complaints regarding treatment plan.Weight loss is strongly encouraged and is emphasized to help reduce risk of diabetes.  Since being off Victoza, she is had steady increase in weight and glucose instability.  A1c was 5.6 at last visit, she is been tried and failed on metformin.  We will try Ozempic  -     semaglutide (OZEMPIC) 2 mg/dose (8 mg/3 mL) PnIj; Inject 2 mg into the skin every 7 days.  Dispense: 3 mL; Refill: 5  -     Hemoglobin  A1C; Future; Expected date: 05/08/2023  -     Insulin, Random; Future; Expected date: 05/08/2023  -     C-Peptide; Future; Expected date: 05/08/2023  -     Comprehensive Metabolic Panel; Future; Expected date: 05/08/2023  -     CBC Without Differential; Future; Expected date: 05/08/2023    3. History of stroke  Minimal residual effect mainly in the form of shuffling of the feet occasionally.    4. Hyperlipidemia, unspecified hyperlipidemia type  Reviewed importance of advanced lipid profiles. Advise daily exercise. Diet is recommended. Patients are encouraged to obtain healthy BMI weight. Risks associated with high cholesterol are well established and include but are not limited to heart disease, stroke and peripheral vascular disease. Patient should not smoke. Goal LDL particle number is <1000 and ApoB <70. Basic LDL is below 100 or below 70 if diabetic. Some non-FDA approved dietary supplements that may be beneficial to patient include but is not limited to high fiber diet at least 30g daily; niacin ER non flush free variety 500mg-1,000mg; Omega-3 fish oil DHA+EPA = 1,000mg twice daily; baby dose aspirin 81mg; co-enzyme q10 500mg to help reduce statin-induced myalgia; red rice yeast 1200mg twice daily; berberine 1000mg-2000mg daily. Patient is encouraged to exercise routinely and adhere to heart healthy diet with Mediterranean diet showing the most consistent data to help with lipid management.  Recently started on statin, we will look at labs and adjust if necessary but we will switch to rosuvastatin given possible drug drug interaction  -     Misc Sendout Test, Blood Advanced Lipid Panel, Cardio IQ ® 10737; Future; Expected date: 05/08/2023  -     Cardio Iq(R) Apolipoprotein B; Future; Expected date: 05/08/2023  -     rosuvastatin (CRESTOR) 20 MG tablet; Take 1 tablet (20 mg total) by mouth once daily.  Dispense: 90 tablet; Refill: 3    5. BMI 31.0-31.9,adult  There are multiple etiologies of weight gain. A   weight loss plan that focuses on diet, exercise and good healthy lifestyle changes are a necessity to help combat obesity. Medication and/orsurgical options are available;  unfortunately,  many options may not be approved by insurance nor FDA approved for obesity but could be very beneficial. There are possible risks associated with therapeutic options and patient should notify us of any concerns. Patients should adhere to plan and follow up routinely as directed.  Previously used to take Qsymia but does not recall why she got off.  If Ozempic is not covered, okay to restart Qsymia    6. Vitamin D deficiency  Used to take prescription strength, and thinks she did better on this.  -     Misc Sendout Test, Blood Vitamin D 80302 [facility] assure; Future; Expected date: 05/08/2023    7. Anxiety and depression  It is advised to identify triggers for anxiety and consider the impact of anxious thinking on functioning. Discussed strategies to regulate symptoms and need for compliance with treatment.  Therapy or counseling may be necessary if current regimen is ineffective. Patient will report any worsening or change in anxiety symptoms if necessary.   on low-dose Cymbalta, try taking at bedtime to see if this helps better with sleep.    8. Seasonal allergies  Requesting refill  -     montelukast (SINGULAIR) 10 mg tablet; Take 1 tablet (10 mg total) by mouth every evening.  Dispense: 90 tablet; Refill: 2           I spent a total of 52 minutes face to face and non-face to face on the date of this visit.This includes time preparing to see the patient (eg, review of tests, notes), obtaining and/or reviewing additional history from an independent historian and/or outside medical records, documenting clinical information in the electronic health record, independently interpreting results and/or communicating results to the patient/family/caregiver, or care coordinator.      Disclaimer: Portions of this record may have been created  "with voice recognition software. Occasional wrong-word or "sound-a-like" substitutions may have occurred due to inherent limitations of voice recognition software. Read the chart carefully and recognize, using context, where substitutions have occurred."    Signed by:  Rosemarie Quezada MD    @[facility]@       "

## 2023-05-09 LAB
ALBUMIN SERPL-MCNC: 4.6 G/DL (ref 3.6–5.1)
ALBUMIN/GLOB SERPL: 1.6 (CALC) (ref 1–2.5)
ALP SERPL-CCNC: 77 U/L (ref 37–153)
ALT SERPL-CCNC: 12 U/L (ref 6–29)
AST SERPL-CCNC: 14 U/L (ref 10–35)
BILIRUB SERPL-MCNC: 0.5 MG/DL (ref 0.2–1.2)
BUN SERPL-MCNC: 25 MG/DL (ref 7–25)
BUN/CREAT SERPL: 23 (CALC) (ref 6–22)
C PEPTIDE SERPL-MCNC: 3.26 NG/ML (ref 0.8–3.85)
CALCIUM SERPL-MCNC: 9.6 MG/DL (ref 8.6–10.4)
CHLORIDE SERPL-SCNC: 105 MMOL/L (ref 98–110)
CO2 SERPL-SCNC: 27 MMOL/L (ref 20–32)
CREAT SERPL-MCNC: 1.1 MG/DL (ref 0.5–1.05)
EGFR: 55 ML/MIN/1.73M2
GLOBULIN SER CALC-MCNC: 2.8 G/DL (CALC) (ref 1.9–3.7)
GLUCOSE SERPL-MCNC: 105 MG/DL (ref 65–99)
HBA1C MFR BLD: 5.4 % OF TOTAL HGB
POTASSIUM SERPL-SCNC: 3.7 MMOL/L (ref 3.5–5.3)
PROT SERPL-MCNC: 7.4 G/DL (ref 6.1–8.1)
SODIUM SERPL-SCNC: 141 MMOL/L (ref 135–146)
T3FREE SERPL-MCNC: 2.4 PG/ML (ref 2.3–4.2)
T4 FREE SERPL-MCNC: 1.5 NG/DL (ref 0.8–1.8)
TSH SERPL-ACNC: 1.35 MIU/L (ref 0.4–4.5)

## 2023-05-10 ENCOUNTER — PATIENT MESSAGE (OUTPATIENT)
Dept: PRIMARY CARE CLINIC | Facility: CLINIC | Age: 68
End: 2023-05-10
Payer: MEDICARE

## 2023-05-10 LAB
25(OH)D3+25(OH)D2 SERPL-MCNC: 43 NG/ML (ref 30–100)
VITAMIN D2 SERPL-MCNC: <4 NG/ML
VITAMIN D3 SERPL-MCNC: 43 NG/ML

## 2023-05-13 ENCOUNTER — PATIENT MESSAGE (OUTPATIENT)
Dept: ADMINISTRATIVE | Facility: HOSPITAL | Age: 68
End: 2023-05-13
Payer: MEDICARE

## 2023-05-13 LAB — T3REVERSE SERPL-MCNC: 31 NG/DL (ref 8–25)

## 2023-05-16 LAB
APO B SERPL-MCNC: 102 MG/DL
CHOLEST SERPL-MCNC: 213 MG/DL
CHOLEST/HDLC SERPL: 3.9 CALC
ERYTHROCYTE [DISTWIDTH] IN BLOOD BY AUTOMATED COUNT: 12.4 % (ref 11–15)
HCT VFR BLD AUTO: 45.6 % (ref 35–45)
HDL ALPHA 3 SER-SCNC: 429 NMOL/L
HDLC SERPL-MCNC: 54 MG/DL
HGB BLD-MCNC: 15.2 G/DL (ref 11.7–15.5)
HLD.LARGE SERPL-SCNC: 9519 NMOL/L
INSULIN SERPL-ACNC: 16.5 UIU/ML
LDL SERPL QN: 215 ANGSTROM
LDL SERPL-SCNC: 2030 NMOL/L
LDL SMALL SERPL-SCNC: 455 NMOL/L
LDLC REAL SIZE PAT SERPL: ABNORMAL PATTERN
LDLC SERPL CALC-MCNC: 130 MG/DL (CALC)
LPA SERPL-SCNC: 38 NMOL/L
MCH RBC QN AUTO: 31.8 PG (ref 27–33)
MCHC RBC AUTO-ENTMCNC: 33.3 G/DL (ref 32–36)
MCV RBC AUTO: 95.4 FL (ref 80–100)
NONHDLC SERPL-MCNC: 159 MG/DL (CALC)
PLATELET # BLD AUTO: 304 THOUSAND/UL (ref 140–400)
PMV BLD REES-ECKER: 10.8 FL (ref 7.5–12.5)
RBC # BLD AUTO: 4.78 MILLION/UL (ref 3.8–5.1)
TRIGL SERPL-MCNC: 172 MG/DL
WBC # BLD AUTO: 6.5 THOUSAND/UL (ref 3.8–10.8)

## 2023-05-17 RX ORDER — ONDANSETRON HYDROCHLORIDE 8 MG/1
8 TABLET, FILM COATED ORAL EVERY 8 HOURS PRN
Qty: 30 TABLET | Refills: 1 | Status: SHIPPED | OUTPATIENT
Start: 2023-05-17

## 2023-05-17 RX ORDER — ZOLPIDEM TARTRATE 5 MG/1
5 TABLET ORAL NIGHTLY PRN
Qty: 30 TABLET | Refills: 5 | Status: SHIPPED | OUTPATIENT
Start: 2023-05-17 | End: 2023-11-27

## 2023-05-18 ENCOUNTER — PATIENT MESSAGE (OUTPATIENT)
Dept: PRIMARY CARE CLINIC | Facility: CLINIC | Age: 68
End: 2023-05-18
Payer: MEDICARE

## 2023-06-26 ENCOUNTER — PATIENT MESSAGE (OUTPATIENT)
Dept: ADMINISTRATIVE | Facility: OTHER | Age: 68
End: 2023-06-26
Payer: MEDICARE

## 2023-06-26 ENCOUNTER — PATIENT MESSAGE (OUTPATIENT)
Dept: PRIMARY CARE CLINIC | Facility: CLINIC | Age: 68
End: 2023-06-26
Payer: MEDICARE

## 2023-07-29 ENCOUNTER — PATIENT MESSAGE (OUTPATIENT)
Dept: PRIMARY CARE CLINIC | Facility: CLINIC | Age: 68
End: 2023-07-29
Payer: MEDICARE

## 2023-07-29 DIAGNOSIS — R73.03 PREDIABETES: ICD-10-CM

## 2023-07-31 RX ORDER — SEMAGLUTIDE 2.68 MG/ML
2 INJECTION, SOLUTION SUBCUTANEOUS
Qty: 3 ML | Refills: 5 | Status: SHIPPED | OUTPATIENT
Start: 2023-07-31 | End: 2023-11-10 | Stop reason: SDUPTHER

## 2023-10-19 ENCOUNTER — PATIENT MESSAGE (OUTPATIENT)
Dept: PRIMARY CARE CLINIC | Facility: CLINIC | Age: 68
End: 2023-10-19
Payer: MEDICARE

## 2023-10-19 RX ORDER — METHOCARBAMOL 500 MG/1
500 TABLET, FILM COATED ORAL NIGHTLY
Qty: 30 TABLET | Refills: 5 | Status: SHIPPED | OUTPATIENT
Start: 2023-10-19 | End: 2024-03-25 | Stop reason: SDUPTHER

## 2023-11-10 ENCOUNTER — PATIENT MESSAGE (OUTPATIENT)
Dept: PRIMARY CARE CLINIC | Facility: CLINIC | Age: 68
End: 2023-11-10
Payer: MEDICARE

## 2023-11-10 DIAGNOSIS — R73.03 PREDIABETES: ICD-10-CM

## 2023-11-13 RX ORDER — SEMAGLUTIDE 2.68 MG/ML
2 INJECTION, SOLUTION SUBCUTANEOUS
Qty: 3 ML | Refills: 5 | Status: SHIPPED | OUTPATIENT
Start: 2023-11-13 | End: 2023-11-27 | Stop reason: SDUPTHER

## 2023-11-22 ENCOUNTER — TELEPHONE (OUTPATIENT)
Dept: PRIMARY CARE CLINIC | Facility: CLINIC | Age: 68
End: 2023-11-22
Payer: MEDICARE

## 2023-11-22 NOTE — TELEPHONE ENCOUNTER
----- Message from Siria Tiwari sent at 11/22/2023  2:28 PM CST -----  .Type:  Pharmacy Calling to Clarify an RX    Name of Caller:Vinnie  Pharmacy Name:[facility]  Prescription Name:OZEMPIC  What do they need to clarify?:verify if pt been on lower dose previously for insurance  Best Call Back Number:[phone]  Additional Information:

## 2023-11-27 ENCOUNTER — OFFICE VISIT (OUTPATIENT)
Dept: PRIMARY CARE CLINIC | Facility: CLINIC | Age: 68
End: 2023-11-27
Payer: MEDICARE

## 2023-11-27 VITALS
SYSTOLIC BLOOD PRESSURE: 130 MMHG | TEMPERATURE: 98 F | HEART RATE: 73 BPM | HEIGHT: 66 IN | OXYGEN SATURATION: 98 % | WEIGHT: 176.56 LBS | DIASTOLIC BLOOD PRESSURE: 72 MMHG | BODY MASS INDEX: 28.38 KG/M2

## 2023-11-27 DIAGNOSIS — F41.9 ANXIETY AND DEPRESSION: ICD-10-CM

## 2023-11-27 DIAGNOSIS — F32.A ANXIETY AND DEPRESSION: ICD-10-CM

## 2023-11-27 DIAGNOSIS — R73.03 PREDIABETES: Primary | ICD-10-CM

## 2023-11-27 DIAGNOSIS — M50.30 DDD (DEGENERATIVE DISC DISEASE), CERVICAL: ICD-10-CM

## 2023-11-27 DIAGNOSIS — K21.00 HIATAL HERNIA WITH GASTROESOPHAGEAL REFLUX DISEASE AND ESOPHAGITIS: ICD-10-CM

## 2023-11-27 DIAGNOSIS — G47.00 INSOMNIA, UNSPECIFIED TYPE: ICD-10-CM

## 2023-11-27 DIAGNOSIS — I10 ESSENTIAL HYPERTENSION: ICD-10-CM

## 2023-11-27 DIAGNOSIS — E03.9 HYPOTHYROIDISM (ACQUIRED): ICD-10-CM

## 2023-11-27 DIAGNOSIS — E78.2 MIXED HYPERLIPIDEMIA: ICD-10-CM

## 2023-11-27 DIAGNOSIS — K44.9 HIATAL HERNIA WITH GASTROESOPHAGEAL REFLUX DISEASE AND ESOPHAGITIS: ICD-10-CM

## 2023-11-27 PROBLEM — L50.9 HIVES: Status: RESOLVED | Noted: 2018-05-24 | Resolved: 2023-11-27

## 2023-11-27 PROCEDURE — 99215 OFFICE O/P EST HI 40 MIN: CPT | Mod: S$GLB,,, | Performed by: FAMILY MEDICINE

## 2023-11-27 PROCEDURE — 3075F SYST BP GE 130 - 139MM HG: CPT | Mod: CPTII,S$GLB,, | Performed by: FAMILY MEDICINE

## 2023-11-27 PROCEDURE — 3075F PR MOST RECENT SYSTOLIC BLOOD PRESS GE 130-139MM HG: ICD-10-PCS | Mod: CPTII,S$GLB,, | Performed by: FAMILY MEDICINE

## 2023-11-27 PROCEDURE — 3008F PR BODY MASS INDEX (BMI) DOCUMENTED: ICD-10-PCS | Mod: CPTII,S$GLB,, | Performed by: FAMILY MEDICINE

## 2023-11-27 PROCEDURE — 1159F PR MEDICATION LIST DOCUMENTED IN MEDICAL RECORD: ICD-10-PCS | Mod: CPTII,S$GLB,, | Performed by: FAMILY MEDICINE

## 2023-11-27 PROCEDURE — 3044F PR MOST RECENT HEMOGLOBIN A1C LEVEL <7.0%: ICD-10-PCS | Mod: CPTII,S$GLB,, | Performed by: FAMILY MEDICINE

## 2023-11-27 PROCEDURE — 1125F AMNT PAIN NOTED PAIN PRSNT: CPT | Mod: CPTII,S$GLB,, | Performed by: FAMILY MEDICINE

## 2023-11-27 PROCEDURE — 3078F PR MOST RECENT DIASTOLIC BLOOD PRESSURE < 80 MM HG: ICD-10-PCS | Mod: CPTII,S$GLB,, | Performed by: FAMILY MEDICINE

## 2023-11-27 PROCEDURE — 99999 PR PBB SHADOW E&M-EST. PATIENT-LVL IV: ICD-10-PCS | Mod: PBBFAC,,, | Performed by: FAMILY MEDICINE

## 2023-11-27 PROCEDURE — 3008F BODY MASS INDEX DOCD: CPT | Mod: CPTII,S$GLB,, | Performed by: FAMILY MEDICINE

## 2023-11-27 PROCEDURE — 1125F PR PAIN SEVERITY QUANTIFIED, PAIN PRESENT: ICD-10-PCS | Mod: CPTII,S$GLB,, | Performed by: FAMILY MEDICINE

## 2023-11-27 PROCEDURE — 3078F DIAST BP <80 MM HG: CPT | Mod: CPTII,S$GLB,, | Performed by: FAMILY MEDICINE

## 2023-11-27 PROCEDURE — 3044F HG A1C LEVEL LT 7.0%: CPT | Mod: CPTII,S$GLB,, | Performed by: FAMILY MEDICINE

## 2023-11-27 PROCEDURE — 1159F MED LIST DOCD IN RCRD: CPT | Mod: CPTII,S$GLB,, | Performed by: FAMILY MEDICINE

## 2023-11-27 PROCEDURE — 99999 PR PBB SHADOW E&M-EST. PATIENT-LVL IV: CPT | Mod: PBBFAC,,, | Performed by: FAMILY MEDICINE

## 2023-11-27 PROCEDURE — 99215 PR OFFICE/OUTPT VISIT, EST, LEVL V, 40-54 MIN: ICD-10-PCS | Mod: S$GLB,,, | Performed by: FAMILY MEDICINE

## 2023-11-27 RX ORDER — DESVENLAFAXINE 50 MG/1
TABLET, EXTENDED RELEASE ORAL
COMMUNITY
End: 2023-11-27

## 2023-11-27 RX ORDER — BUPROPION HYDROCHLORIDE 150 MG/1
150 TABLET ORAL DAILY
Qty: 30 TABLET | Refills: 11 | Status: SHIPPED | OUTPATIENT
Start: 2023-11-27 | End: 2024-11-26

## 2023-11-27 RX ORDER — LOTEPREDNOL ETABONATE 3.8 MG/G
GEL OPHTHALMIC
COMMUNITY
Start: 2023-11-17

## 2023-11-27 RX ORDER — SEMAGLUTIDE 2.68 MG/ML
2 INJECTION, SOLUTION SUBCUTANEOUS
Qty: 9 ML | Refills: 2 | Status: SHIPPED | OUTPATIENT
Start: 2023-11-27 | End: 2024-01-02 | Stop reason: SDUPTHER

## 2023-11-27 RX ORDER — LEVOTHYROXINE SODIUM 200 UG/1
200 TABLET ORAL
Qty: 90 TABLET | Refills: 1 | Status: SHIPPED | OUTPATIENT
Start: 2023-11-27

## 2023-11-27 RX ORDER — PROMETHAZINE HYDROCHLORIDE 25 MG/1
25 TABLET ORAL 4 TIMES DAILY PRN
COMMUNITY
Start: 2023-06-01

## 2023-11-27 RX ORDER — PANTOPRAZOLE SODIUM 40 MG/1
40 TABLET, DELAYED RELEASE ORAL 2 TIMES DAILY
Qty: 180 TABLET | Refills: 2 | Status: SHIPPED | OUTPATIENT
Start: 2023-11-27

## 2023-11-27 RX ORDER — CHOLECALCIFEROL (VITAMIN D3) 25 MCG
1 TABLET ORAL EVERY MORNING
COMMUNITY

## 2023-11-27 RX ORDER — ZOLPIDEM TARTRATE 5 MG/1
TABLET ORAL
Qty: 30 TABLET | Refills: 5 | Status: SHIPPED | OUTPATIENT
Start: 2023-11-27

## 2023-11-27 RX ORDER — CETIRIZINE HYDROCHLORIDE 10 MG/1
10 TABLET ORAL DAILY
COMMUNITY

## 2023-11-27 NOTE — PROGRESS NOTES
"    [facility]       [address] [place] [address]      [phone]     Rosemarie Quezada MD         .      Office Visit  11/27/2023  [id number]      SUBJECTIVE     HPI:  Nicollette Mourot is a 68 y.o. female presents today in clinic for "Follow-up  ."   Patient is here for routine follow-up.  She did not have labs drawn, but wants to get them done.  At her last visit, her cholesterol was noted to be quite elevated.  Recall she is has a history of stroke and is currently taking that medication.  Since her last visit, she is managed to lose 25 lb since getting back on Ozempic.  She feels like it helps greatly with her appetite.  She is also trying to avoid any medications for pain that would contribute to her weight regain.  She is very frustrated still that her pain often determine what she can and can not do.  Her sleep is not optimal despite taking Ambien so she is no longer taking it just taking it as needed.  She is still taking thyroid medication as she feels like she has no overactive thyroid symptoms.  She does get a little bit more depressed during this winter months, but thinks this is a lot to do with her weight and then of course not being able to be active as she wants to be.  She does think the Cymbalta helps with some of the agitation in irritability, but scared to go up due to the weight gain possibility.         ROS   Review of symptoms are negative except as noted.     PAST MEDICAL HISTORY     Past Medical History:   Diagnosis Date    Anxiety     Depression     GERD (gastroesophageal reflux disease)     Hypertension        Past Surgical History:   Procedure Laterality Date    BACK SURGERY      LITHOTRIPSY      NECK SURGERY         Social History     Socioeconomic History    Marital status:    Tobacco Use    Smoking status: Never     Passive exposure: Never    Smokeless tobacco: Never   Substance and Sexual Activity    Alcohol " use: No       Allergies as of 11/27/2023 - Reviewed 11/27/2023   Allergen Reaction Noted    Divalproex Other (See Comments) 05/25/2015    Iodine and iodide containing products Rash 05/23/2018    Morphine Anaphylaxis 05/25/2012    Morpholine analogues Hives 05/23/2018    Prednisone Hives and Anaphylaxis 12/02/2019    Gabapentin Hives 05/24/2018    Iodine Other (See Comments) 05/25/2012    Adhesive Rash 08/08/2018       HOME MEDS     Current Outpatient Medications   Medication Instructions    albuterol (ACCUNEB) 0.63 mg/3 mL Nebu albuterol sulfate Take No date recorded No form recorded No frequency recorded No route recorded No set duration recorded No set duration amount recorded suspended No dosage strength recorded No dosage strength units of measure recorded    BREO ELLIPTA 200-25 mcg/dose DsDv diskus inhaler Inhalation    buPROPion (WELLBUTRIN XL) 150 mg, Oral, Daily    cetirizine (ZYRTEC) 10 mg, Oral, Daily    DULoxetine (CYMBALTA) 30 MG capsule Oral    dupilumab (DUPIXENT SYRINGE) 200 mg/1.14 mL Syrg INJECT 200MG (1 INJECTION) SUBCUTANEOUSLY EVERY OTHER WEEK.    EPINEPHrine (EPIPEN) 0.3 mg, Intramuscular, As needed (PRN)    hydroCHLOROthiazide (HYDRODIURIL) 12.5 mg, Oral, Daily, Every other day    HYDROCODONE-ACETAMINOPHEN ORAL Daily PRN    levothyroxine (SYNTHROID) 200 mcg, Oral, Before breakfast    LOTEMAX SM 0.38 % DrpG Both Eyes    methocarbamoL (ROBAXIN) 500 mg, Oral, Nightly    montelukast (SINGULAIR) 10 mg, Oral, Nightly    ondansetron (ZOFRAN) 8 mg, Oral, Every 8 hours PRN    OZEMPIC 2 mg, Subcutaneous, Every 7 days    pantoprazole (PROTONIX) 40 mg, Oral, 2 times daily    promethazine (PHENERGAN) 25 mg, Oral, 4 times daily PRN    rosuvastatin (CRESTOR) 20 mg, Oral, Daily    vitamin D (VITAMIN D3) 1000 units Tab 1 tablet, Oral, Every morning    zolpidem (AMBIEN) 5 MG Tab TAKE 1 TABLET EVERY NIGHT AS NEEDED FOR SLEEP       The following were updated and reviewed by myself in the chart: medications, past  "medical history, past surgical history, family history, social history, and allergies.        Objective    OBJECTIVE   /72   Pulse 73   Temp 97.9 °F (36.6 °C)   Ht 5' 6" (1.676 m)   Wt 80.1 kg (176 lb 9.4 oz)   SpO2 98%   BMI 28.50 kg/m²     Wt Readings from Last 2 Encounters:   11/27/23 80.1 kg (176 lb 9.4 oz)   05/08/23 87.6 kg (193 lb 2 oz)       BP Readings from Last 2 Encounters:   11/27/23 130/72   05/08/23 114/75          Physical Exam  Vitals and nursing note reviewed.   Constitutional:       Appearance: Normal appearance. She is overweight.   HENT:      Head: Normocephalic and atraumatic.      Nose: Nose normal.   Eyes:      General: Allergic shiner present.      Extraocular Movements: Extraocular movements intact.      Conjunctiva/sclera: Conjunctivae normal.      Pupils: Pupils are equal, round, and reactive to light.      Comments: Allergic shiner   Cardiovascular:      Rate and Rhythm: Normal rate and regular rhythm.   Pulmonary:      Effort: Pulmonary effort is normal.      Breath sounds: Normal breath sounds.   Abdominal:      General: Abdomen is flat.      Palpations: Abdomen is soft.      Tenderness: There is no abdominal tenderness.      Comments: Increased abdominal girth   Musculoskeletal:         General: No swelling or tenderness. Normal range of motion.      Cervical back: Normal range of motion.      Comments: TMJ tenderness   Lymphadenopathy:      Cervical: No cervical adenopathy.   Skin:     General: Skin is warm.      Findings: No lesion or rash.   Neurological:      General: No focal deficit present.      Mental Status: She is alert. Mental status is at baseline.      Cranial Nerves: Cranial nerves 2-12 are intact.      Sensory: Sensation is intact.      Motor: Motor function is intact.   Psychiatric:         Mood and Affect: Mood is anxious.         Behavior: Behavior normal.         Cognition and Memory: Cognition and memory normal.               LABS      LAB RESULTS, IF " AVAILABLE, ARE DISCUSSED WITH PATIENT AND POSTED TO PATIENT PORTAL     ASSESSMENT & PLAN     1. Prediabetes  -     semaglutide (OZEMPIC) 2 mg/dose (8 mg/3 mL) PnIj; Inject 2 mg into the skin every 7 days.  Dispense: 9 mL; Refill: 2    2. Essential hypertension    3. Hypothyroidism (acquired)  -     TSH; Future; Expected date: 04/27/2024  -     T3, Free; Future; Expected date: 04/27/2024  -     T4, Free; Future; Expected date: 04/27/2024  -     levothyroxine (SYNTHROID) 200 MCG tablet; Take 1 tablet (200 mcg total) by mouth before breakfast.  Dispense: 90 tablet; Refill: 1    4. DDD (degenerative disc disease), cervical    5. Insomnia, unspecified type    6. Mixed hyperlipidemia  -     CBC Auto Differential; Future; Expected date: 04/27/2024  -     Comprehensive Metabolic Panel; Future; Expected date: 04/27/2024  -     Lipid Panel; Future; Expected date: 04/27/2024    7. Anxiety and depression  -     buPROPion (WELLBUTRIN XL) 150 MG TB24 tablet; Take 1 tablet (150 mg total) by mouth once daily.  Dispense: 30 tablet; Refill: 11    8. Hiatal hernia with gastroesophageal reflux disease and esophagitis  -     pantoprazole (PROTONIX) 40 MG tablet; Take 1 tablet (40 mg total) by mouth 2 (two) times daily.  Dispense: 180 tablet; Refill: 2      Patient has been doing very well on the Ozempic, so we will make no changes at this time.  She would like to lose another 25 lb.  She is continuing to do well on weight watchers.  She is only exercising as tolerated.  We will get the labs.  We will also look at thyroid though, patient is only taking 1/2 tablet to help with cost.  Her degenerative disc disease in the back and neck is currently controlled with a pain stimulator but it is not completely resolved.  Agree to go ahead and restart Wellbutrin to see if this can help more with her mood and then of course her weight loss.  We will repeat cholesterol labs with anticipation that it has improved given the recent weight loss she is  "had.  Also refill the Protonix to help with the gastritis that she has.  She is been doing well with symptoms improved with twice a day dosing.  We will have her come back in six months or sooner if patient is having any issues.    I spent a total of 45 minutes face to face and non-face to face on the date of this visit.This includes time preparing to see the patient (eg, review of tests, notes), obtaining and/or reviewing additional history from an independent historian and/or outside medical records, documenting clinical information in the electronic health record, independently interpreting results and/or communicating results to the patient/family/caregiver, or care coordinator.      Disclaimer: Portions of this record may have been created with voice recognition software. Occasional wrong-word or "sound-a-like" substitutions may have occurred due to inherent limitations of voice recognition software. Read the chart carefully and recognize, using context, where substitutions have occurred."    Signed by:  Rosemarie Quezada MD           "

## 2023-11-28 ENCOUNTER — TELEPHONE (OUTPATIENT)
Dept: PRIMARY CARE CLINIC | Facility: CLINIC | Age: 68
End: 2023-11-28
Payer: MEDICARE

## 2023-11-28 NOTE — TELEPHONE ENCOUNTER
----- Message from Ramona Miranda sent at 11/28/2023  1:50 PM CST -----  Type:  Pharmacy Calling to Clarify an RX    Name of Caller:Derek kearns   Pharmacy Name: [facility]   Prescription Name:OZEMPIC) 2  What do they need to clarify?:have some question   Best Call Back Number:[phone]  Additional Information call back

## 2023-12-05 ENCOUNTER — TELEPHONE (OUTPATIENT)
Dept: PRIMARY CARE CLINIC | Facility: CLINIC | Age: 68
End: 2023-12-05
Payer: MEDICARE

## 2023-12-05 ENCOUNTER — PATIENT MESSAGE (OUTPATIENT)
Dept: PRIMARY CARE CLINIC | Facility: CLINIC | Age: 68
End: 2023-12-05
Payer: MEDICARE

## 2023-12-05 NOTE — TELEPHONE ENCOUNTER
----- Message from Katherine Corcoran sent at 12/5/2023 10:51 AM CST -----  Contact: [phone]  Requesting an RX refill or new RX.  Is this a refill or new RX: refill  RX name and strength (copy/paste from chart):  levothyroxine (SYNTHROID) 200 MCG tablet  Is this a 30 day or 90 day RX: 10 day   Pharmacy name and phone # (copy/paste from chart):    [id number]  [place] - [address]  [address] [facility]  [facility] [address]  Phone: [phone] Fax: [phone]  The doctors have asked that we provide their patients with the following 2 reminders -- prescription refills can take up to 72 hours, and a friendly reminder that in the future you can use your [facility] account to request refills: aware     Requesting an RX refill or new RX.  Is this a refill or new RX: refill  RX name and strength (copy/paste from chart):  pantoprazole (PROTONIX) 40 MG tablet  Is this a 30 day or 90 day RX: 14 day   Pharmacy name and phone # (copy/paste from chart):   [id number]  [place], [place] - [address]  [address]  [facility] [address]  Phone: [phone] Fax: [phone]  The doctors have asked that we provide their patients with the following 2 reminders -- prescription refills can take up to 72 hours, and a friendly reminder that in the future you can use your [facility] account to request refills: aware     Requesting an RX refill or new RX.  Is this a refill or new RX: refill  RX name and strength (copy/paste from chart):  buPROPion (WELLBUTRIN XL) 150 MG TB24 tablet  Is this a 30 day or 90 day RX: 14 day  Pharmacy name and phone # (copy/paste from chart):   [id number]  [place], [place] - [address]  [address] [facility]  [facility] [address]  Phone: [phone] Fax: [phone]  The doctors have asked that we provide their patients with the following 2 reminders -- prescription refills can take up to 72 hours, and a friendly  reminder that in the future you can use your [facility] account to request refills: aware     Pt says she is currently awaiting a refill for her medication from the mail in pharmacy and was told that the medication was in review. However the pt is currently out of the above medications and the wait for the delivery alone is going to take 7-10 days. Pt would like to have the above medications sent to her local medications. Please Advise

## 2023-12-08 LAB
ALBUMIN SERPL-MCNC: 4.5 G/DL (ref 3.6–5.1)
ALBUMIN/GLOB SERPL: 1.8 (CALC) (ref 1–2.5)
ALP SERPL-CCNC: 67 U/L (ref 37–153)
ALT SERPL-CCNC: 12 U/L (ref 6–29)
AST SERPL-CCNC: 15 U/L (ref 10–35)
BASOPHILS # BLD AUTO: 63 CELLS/UL (ref 0–200)
BASOPHILS NFR BLD AUTO: 1.1 %
BILIRUB SERPL-MCNC: 0.6 MG/DL (ref 0.2–1.2)
BUN SERPL-MCNC: 17 MG/DL (ref 7–25)
BUN/CREAT SERPL: NORMAL (CALC) (ref 6–22)
CALCIUM SERPL-MCNC: 9.9 MG/DL (ref 8.6–10.4)
CHLORIDE SERPL-SCNC: 103 MMOL/L (ref 98–110)
CHOLEST SERPL-MCNC: 153 MG/DL
CHOLEST/HDLC SERPL: 2.6 (CALC)
CO2 SERPL-SCNC: 30 MMOL/L (ref 20–32)
CREAT SERPL-MCNC: 0.9 MG/DL (ref 0.5–1.05)
EGFR: 70 ML/MIN/1.73M2
EOSINOPHIL # BLD AUTO: 171 CELLS/UL (ref 15–500)
EOSINOPHIL NFR BLD AUTO: 3 %
ERYTHROCYTE [DISTWIDTH] IN BLOOD BY AUTOMATED COUNT: 11.9 % (ref 11–15)
GLOBULIN SER CALC-MCNC: 2.5 G/DL (CALC) (ref 1.9–3.7)
GLUCOSE SERPL-MCNC: 97 MG/DL (ref 65–99)
HCT VFR BLD AUTO: 44.5 % (ref 35–45)
HDLC SERPL-MCNC: 58 MG/DL
HGB BLD-MCNC: 14.6 G/DL (ref 11.7–15.5)
LDLC SERPL CALC-MCNC: 73 MG/DL (CALC)
LYMPHOCYTES # BLD AUTO: 1761 CELLS/UL (ref 850–3900)
LYMPHOCYTES NFR BLD AUTO: 30.9 %
MCH RBC QN AUTO: 31.4 PG (ref 27–33)
MCHC RBC AUTO-ENTMCNC: 32.8 G/DL (ref 32–36)
MCV RBC AUTO: 95.7 FL (ref 80–100)
MONOCYTES # BLD AUTO: 393 CELLS/UL (ref 200–950)
MONOCYTES NFR BLD AUTO: 6.9 %
NEUTROPHILS # BLD AUTO: 3312 CELLS/UL (ref 1500–7800)
NEUTROPHILS NFR BLD AUTO: 58.1 %
NONHDLC SERPL-MCNC: 95 MG/DL (CALC)
PLATELET # BLD AUTO: 308 THOUSAND/UL (ref 140–400)
PMV BLD REES-ECKER: 10.3 FL (ref 7.5–12.5)
POTASSIUM SERPL-SCNC: 3.9 MMOL/L (ref 3.5–5.3)
PROT SERPL-MCNC: 7 G/DL (ref 6.1–8.1)
RBC # BLD AUTO: 4.65 MILLION/UL (ref 3.8–5.1)
SODIUM SERPL-SCNC: 141 MMOL/L (ref 135–146)
T3FREE SERPL-MCNC: 3.1 PG/ML (ref 2.3–4.2)
T4 FREE SERPL-MCNC: 1.5 NG/DL (ref 0.8–1.8)
TRIGL SERPL-MCNC: 135 MG/DL
TSH SERPL-ACNC: 0.97 MIU/L (ref 0.4–4.5)
WBC # BLD AUTO: 5.7 THOUSAND/UL (ref 3.8–10.8)

## 2024-01-01 ENCOUNTER — PATIENT MESSAGE (OUTPATIENT)
Dept: PRIMARY CARE CLINIC | Facility: CLINIC | Age: 69
End: 2024-01-01
Payer: MEDICARE

## 2024-01-01 DIAGNOSIS — R73.03 PREDIABETES: ICD-10-CM

## 2024-01-02 ENCOUNTER — TELEPHONE (OUTPATIENT)
Dept: PRIMARY CARE CLINIC | Facility: CLINIC | Age: 69
End: 2024-01-02
Payer: MEDICARE

## 2024-01-02 DIAGNOSIS — R73.03 PREDIABETES: ICD-10-CM

## 2024-01-02 RX ORDER — SEMAGLUTIDE 2.68 MG/ML
2 INJECTION, SOLUTION SUBCUTANEOUS
Qty: 9 ML | Refills: 2 | Status: SHIPPED | OUTPATIENT
Start: 2024-01-02

## 2024-01-02 RX ORDER — SEMAGLUTIDE 2.68 MG/ML
2 INJECTION, SOLUTION SUBCUTANEOUS
Qty: 9 ML | Refills: 2 | Status: SHIPPED | OUTPATIENT
Start: 2024-01-02 | End: 2024-01-02 | Stop reason: SDUPTHER

## 2024-01-02 NOTE — TELEPHONE ENCOUNTER
----- Message from Patricia Verde sent at 1/2/2024 10:42 AM CST -----  Contact: Jenni/[facility]  Type:  Pharmacy Calling to Clarify an RX    Name of Caller:Jenni  Pharmacy Name:[facility]  Prescription Name:semaglutide (OZEMPIC) 2 mg/dose (8 mg/3 mL) PnI  What do they need to clarify?: dosage for medication  Best Call Back Number:[phone]  Additional Information:   Thanks   Am

## 2024-01-04 RX ORDER — SEMAGLUTIDE 2.68 MG/ML
2 INJECTION, SOLUTION SUBCUTANEOUS
Qty: 9 ML | Refills: 2 | Status: SHIPPED | OUTPATIENT
Start: 2024-01-04

## 2024-01-26 ENCOUNTER — TELEPHONE (OUTPATIENT)
Dept: PRIMARY CARE CLINIC | Facility: CLINIC | Age: 69
End: 2024-01-26
Payer: MEDICARE

## 2024-01-26 NOTE — TELEPHONE ENCOUNTER
----- Message from Artemio Turcios sent at 1/26/2024 12:35 PM CST -----  Regarding: Appt  Contact: [phone]  [facility] case management is calling to schedule appointment for hospital follow up no dates in epic. Please contact pt

## 2024-02-06 DIAGNOSIS — Z12.11 COLON CANCER SCREENING: ICD-10-CM

## 2024-03-14 DIAGNOSIS — E78.5 HYPERLIPIDEMIA, UNSPECIFIED HYPERLIPIDEMIA TYPE: ICD-10-CM

## 2024-03-14 DIAGNOSIS — R73.03 PREDIABETES: ICD-10-CM

## 2024-03-14 RX ORDER — ROSUVASTATIN CALCIUM 20 MG/1
20 TABLET, COATED ORAL DAILY
Qty: 90 TABLET | Refills: 3 | Status: SHIPPED | OUTPATIENT
Start: 2024-03-14 | End: 2025-03-14

## 2024-03-14 RX ORDER — SEMAGLUTIDE 2.68 MG/ML
2 INJECTION, SOLUTION SUBCUTANEOUS
Qty: 9 ML | Refills: 2 | Status: SHIPPED | OUTPATIENT
Start: 2024-03-14

## 2024-03-15 ENCOUNTER — PATIENT MESSAGE (OUTPATIENT)
Dept: PRIMARY CARE CLINIC | Facility: CLINIC | Age: 69
End: 2024-03-15
Payer: MEDICARE

## 2024-03-18 ENCOUNTER — TELEPHONE (OUTPATIENT)
Dept: PRIMARY CARE CLINIC | Facility: CLINIC | Age: 69
End: 2024-03-18
Payer: MEDICARE

## 2024-03-18 ENCOUNTER — PATIENT MESSAGE (OUTPATIENT)
Dept: PRIMARY CARE CLINIC | Facility: CLINIC | Age: 69
End: 2024-03-18
Payer: MEDICARE

## 2024-03-18 DIAGNOSIS — M50.30 DDD (DEGENERATIVE DISC DISEASE), CERVICAL: Primary | ICD-10-CM

## 2024-03-18 RX ORDER — DULOXETIN HYDROCHLORIDE 30 MG/1
30 CAPSULE, DELAYED RELEASE ORAL DAILY
Qty: 90 CAPSULE | Refills: 1 | Status: SHIPPED | OUTPATIENT
Start: 2024-03-18 | End: 2024-03-18 | Stop reason: SDUPTHER

## 2024-03-18 RX ORDER — DULOXETIN HYDROCHLORIDE 30 MG/1
30 CAPSULE, DELAYED RELEASE ORAL DAILY
Qty: 90 CAPSULE | Refills: 1 | Status: SHIPPED | OUTPATIENT
Start: 2024-03-18 | End: 2024-05-16

## 2024-03-18 NOTE — TELEPHONE ENCOUNTER
----- Message from Dianne Carmichael sent at 3/18/2024 10:36 AM CDT -----  Contact: Gurjit with [facility] phone [phone]  Requesting an RX refill or new RX.  Is this a refill or new RX: Refill  RX name and strength (copy/paste from chart):  methocarbamoL (ROBAXIN) 500 MG Tab  Is this a 30 day or 90 day RX: 90  Pharmacy name and phone # (copy/paste from chart):    [facility] - [place], [place] - [address]  [address] [facility] [address] [facility] [address]  Phone: [phone] Fax: [phone]  The doctors have asked that we provide their patients with the following 2 reminders -- prescription refills can take up to 72 hours, and a friendly reminder that in the future you can use your [facility] account to request refills: N/A

## 2024-03-25 RX ORDER — METHOCARBAMOL 500 MG/1
500 TABLET, FILM COATED ORAL NIGHTLY
Qty: 90 TABLET | Refills: 1 | Status: SHIPPED | OUTPATIENT
Start: 2024-03-25

## 2024-04-23 ENCOUNTER — PATIENT MESSAGE (OUTPATIENT)
Dept: FAMILY MEDICINE | Facility: CLINIC | Age: 69
End: 2024-04-23
Payer: MEDICARE

## 2024-05-02 DIAGNOSIS — E03.9 HYPOTHYROIDISM (ACQUIRED): ICD-10-CM

## 2024-05-02 RX ORDER — LEVOTHYROXINE SODIUM 200 UG/1
200 TABLET ORAL
Qty: 90 TABLET | Refills: 0 | Status: SHIPPED | OUTPATIENT
Start: 2024-05-02

## 2024-05-13 ENCOUNTER — PATIENT MESSAGE (OUTPATIENT)
Dept: FAMILY MEDICINE | Facility: CLINIC | Age: 69
End: 2024-05-13
Payer: MEDICARE

## 2024-05-16 RX ORDER — DULOXETIN HYDROCHLORIDE 30 MG/1
30 CAPSULE, DELAYED RELEASE ORAL DAILY
Qty: 90 CAPSULE | Refills: 0 | Status: SHIPPED | OUTPATIENT
Start: 2024-05-16

## 2024-07-04 DIAGNOSIS — K21.00 HIATAL HERNIA WITH GASTROESOPHAGEAL REFLUX DISEASE AND ESOPHAGITIS: ICD-10-CM

## 2024-07-04 DIAGNOSIS — K44.9 HIATAL HERNIA WITH GASTROESOPHAGEAL REFLUX DISEASE AND ESOPHAGITIS: ICD-10-CM

## 2024-07-08 DIAGNOSIS — E03.9 HYPOTHYROIDISM (ACQUIRED): ICD-10-CM

## 2024-07-09 RX ORDER — PANTOPRAZOLE SODIUM 40 MG/1
40 TABLET, DELAYED RELEASE ORAL 2 TIMES DAILY
Qty: 180 TABLET | Refills: 2 | Status: SHIPPED | OUTPATIENT
Start: 2024-07-09

## 2024-07-09 RX ORDER — LEVOTHYROXINE SODIUM 200 UG/1
200 TABLET ORAL
Qty: 90 TABLET | Refills: 2 | Status: SHIPPED | OUTPATIENT
Start: 2024-07-09